# Patient Record
Sex: MALE | Race: WHITE | NOT HISPANIC OR LATINO | Employment: UNEMPLOYED | URBAN - METROPOLITAN AREA
[De-identification: names, ages, dates, MRNs, and addresses within clinical notes are randomized per-mention and may not be internally consistent; named-entity substitution may affect disease eponyms.]

---
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
## 2024-03-22 ENCOUNTER — DOCUMENTATION (OUTPATIENT)
Dept: PSYCHIATRY | Facility: CLINIC | Age: 19
End: 2024-03-22

## 2024-03-22 DIAGNOSIS — F90.0 ADHD (ATTENTION DEFICIT HYPERACTIVITY DISORDER), INATTENTIVE TYPE: Primary | ICD-10-CM

## 2024-03-22 NOTE — PSYCH
"PSYCHIATRIC DISCHARGE SUMMARY    [facility]    Name and Date of Birth:  Dallas Tafoya 19 y.o. 2005    Admission Date: 5/10/2018  Discharge Date: 3/22/2024 Referral source: self    Discharge Type: Did not return for follow up    Discharge Diagnosis:     1. ADHD (attention deficit hyperactivity disorder), inattentive type          Treating Physician: Nichole Malhotra MD     Treatment Complications: Did not return for follow up.     Admit with discharge: No    Prognosis at time of discharge: Fair    Presenting Problems/Pertinent Findings:      Dallas initially presented for treatment due to  ADHD symptoms . Primary complaints included ADHD SYMPTOMS: decreased concentration, decreased attention span, restlessness.  He reported on 8/14/23 at initial evaluation with this writer \"more difficulty concentrating and hyperactivity, attention span is shorter and he is jumping around thought to thought \"    Therapist: None    Course of Treatment: Psychiatric Evaluation and Medication Management    Summary of Treatment Progress:     Dallas was seen for initial Psychiatric Evaluation and medication management. He initially began seeing Dr. Mcknight on 5/10/2018 for ADHD. He then was transferred to resident clinic in November 2022 and saw Dr. Castillo for two visits.  He initially saw this writer for evaluation in 8/14/23, and follow up on 9/18/23. During the course of treatment he was treated mostly with stimulants, including concerta 36mg daily most recently. At the last visit on 9/18/23 he denied any other suicidal ideation, intent or plan at present, denies any other homicidal ideation, intent or plan at present. He denies any auditory hallucinations, denies any visual hallucinations, denies any delusions. He denies any side effects from medications.     Past Psychiatric History:     Past Psychiatric History:  Previous inpatient psychiatric admissions:  Denies.  states he " had to go to the ED in 2021 for suspicion for SI, but he denies ever going to inpatient unit.  Previous intensive outpatient psychiatric services: denies  Previous inpatient/outpatient substance abuse rehabilitation: denies.  Present/previous outpatient psychiatric services[facility] for [facility], most recently with Dr. Castillo for 7 months  Present/previous psychotherapy services: briefly when younger.  History of suicidal attempts/gestures: denies, 2 years ago, didn't go to hospital.  History of violence/aggressive behaviors: denies.  Present/previous psychotropic medication use: zoloft (stopped due to feeling better w depression/anxiety), ativan (anx and panic attacks, stopped because feeling better), vyvanse, adderall, concerta (worked the best)     Substance Abuse History:   Obtained from most recent evaluation with Dr. Castillo. Updated and verified with patient today.  Patient denies history of alcohol, illict substance, or tobacco abuse. Patient denies previous legal actions or arrests related to substance intoxication including prior DWIs/DUIs. Dallas does not apear under the influence or withdrawal of any psychoactive substance throughout today's examination.      Social History:  Obtained from most recent evaluation with Dr. Castillo. Updated and verified with patient today.  Developmental: denies a history of milestone/developmental delay. Denies a history of in-utero exposure to toxins/illicit substances. There is no documented history of IEP or need for special education.  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 Previously employed at [employer].  Also working as a   Access  to firearms: yes, father owns, safe, secured, keys hidden. Dallas Tafoya has no history of arrests or violence pertaining to use of a deadly weapon.     History Review:  The following portions of the patient's history were reviewed and updated as appropriate: allergies, current medications, past family history, past medical history, past social history, past surgical history, and problem list.. Reviewed on 9/18/23.      MENTAL STATUS EVALUATION (at time of most recent visit on 9/18/23):    Appearance age appropriate, casually dressed, long hair   Behavior pleasant, cooperative, calm   Speech normal rate, normal volume, normal pitch   Mood euthymic   Affect normal range and intensity, appropriate   Thought Processes organized, logical, goal directed   Associations intact associations   Thought Content no overt delusions   Perceptual Disturbances: Denies auditory or visual hallucinations and Does not appear to be responding to internal stimuli   Abnormal Thoughts  Risk Potential Denies suicidal or homicidal ideation, plan, or intent   Orientation oriented to person, place, time/date and situation   Memory recent and remote memory grossly intact   Consciousness alert and awake   Attention Span Concentration Span attention span and concentration are age appropriate   Intellect appears to be of average intelligence   Insight fair   Judgement fair   Muscle Strength and  Gait unable to assess today due to virtual visit   Motor Activity unable to assess today due to virtual visit   Language no difficulty naming common objects, no difficulty repeating a phrase   Fund of Knowledge adequate knowledge of current events  adequate fund of knowledge regarding past history  adequate fund of knowledge regarding vocabulary         To what extent did Dallas achieve his goals?: Most    Criteria for Discharge: Did not return for treatment. Did not respond to reminder letter to reschedule follow up appointment.    Aftercare  Recommendations:     Follow up with family physician for psychiatric issues.    Discharge Medications:     Current Outpatient Medications:     albuterol (2.5 mg/3 mL) 0.083 % nebulizer solution, Inhale, Disp: , Rfl:     albuterol (PROVENTIL HFA,VENTOLIN HFA) 90 mcg/act inhaler, INHALE 2 PUFFS EVERY 6 (SIX) HOURS AS NEEDED FOR WHEEZING, Disp: 18 g, Rfl: 0    benzonatate (TESSALON PERLES) 100 mg capsule, Take 1 capsule (100 mg total) by mouth 3 (three) times a day as needed for cough (Patient not taking: Reported on 5/12/2023), Disp: 20 capsule, Rfl: 0    brompheniramine-pseudoephedrine-DM 30-2-10 MG/5ML syrup, Take 10 mL by mouth 3 (three) times a day as needed for congestion or cough (Patient not taking: Reported on 12/22/2022), Disp: 120 mL, Rfl: 0    Cholecalciferol 2000 units CAPS, Take 1 capsule by mouth (Patient not taking: Reported on 11/12/2021), Disp: , Rfl:     cyclobenzaprine (FLEXERIL) 5 mg tablet, Take 5-10 mg by mouth Three times daily as needed (Patient not taking: Reported on 6/9/2022), Disp: , Rfl:     fluticasone (FLONASE) 50 mcg/act nasal spray, 1 SPRAY INTO EACH NOSTRIL DAILY, Disp: 16 g, Rfl: 0    fluticasone (FLOVENT HFA) 110 MCG/ACT inhaler, Inhale (Patient not taking: Reported on 1/31/2023), Disp: , Rfl:     LORazepam (ATIVAN) 0.5 mg tablet, Take 1 tablet (0.5 mg total) by mouth daily as needed (Severe anxiety or panic attack) (Patient not taking: Reported on 9/27/2022), Disp: 15 tablet, Rfl: 0    methylphenidate (Concerta) 36 MG ER tablet, Take 1 tablet (36 mg total) by mouth daily Max Daily Amount: 36 mg, Disp: 30 tablet, Rfl: 0    montelukast (SINGULAIR) 5 mg chewable tablet, Chew 1 tablet (5 mg total) daily at bedtime, Disp: 90 tablet, Rfl: 1    naproxen (Naprosyn) 500 mg tablet, Take 1 tablet (500 mg total) by mouth 2 (two) times a day with meals for 11 days, Disp: 21 tablet, Rfl: 0    SODIUM FLUORIDE, DENTAL GEL, 1.1 % GEL, USE AS DIRECTED (Patient not taking: Reported on 9/27/2022),  Disp: , Rfl:     VITAMIN D3 SUPER STRENGTH 2000 units tablet, , Disp: , Rfl:      Describe ability and willingness to work and solve mental problems:     Able to solve his mental health problems    Nichole Malhotra MD 03/22/24

## 2024-03-25 ENCOUNTER — TELEPHONE (OUTPATIENT)
Dept: PSYCHIATRY | Facility: CLINIC | Age: 19
End: 2024-03-25

## 2024-03-25 NOTE — TELEPHONE ENCOUNTER
DISCHARGE LETTER for [facility] (certified and regular) placed in outgoing mail on 03/25/24.    Article #:  [id number]    Address:  [address] [place] [id number]

## 2024-04-03 ENCOUNTER — OCCMED (OUTPATIENT)
Dept: URGENT CARE | Facility: MEDICAL CENTER | Age: 19
End: 2024-04-03
Payer: OTHER MISCELLANEOUS

## 2024-04-03 ENCOUNTER — APPOINTMENT (OUTPATIENT)
Dept: RADIOLOGY | Facility: MEDICAL CENTER | Age: 19
End: 2024-04-03

## 2024-04-03 DIAGNOSIS — M25.531 ACUTE PAIN OF RIGHT WRIST: Primary | ICD-10-CM

## 2024-04-03 PROCEDURE — 99283 EMERGENCY DEPT VISIT LOW MDM: CPT | Performed by: ORTHOPAEDIC SURGERY

## 2024-04-03 PROCEDURE — 73110 X-RAY EXAM OF WRIST: CPT

## 2024-04-03 PROCEDURE — G0382 LEV 3 HOSP TYPE B ED VISIT: HCPCS | Performed by: ORTHOPAEDIC SURGERY

## 2024-04-09 ENCOUNTER — APPOINTMENT (OUTPATIENT)
Dept: URGENT CARE | Facility: MEDICAL CENTER | Age: 19
End: 2024-04-09
Payer: OTHER MISCELLANEOUS

## 2024-04-09 PROCEDURE — 99213 OFFICE O/P EST LOW 20 MIN: CPT | Performed by: NURSE PRACTITIONER

## 2024-08-10 NOTE — TELEPHONE ENCOUNTER
Hyperkalemia is likely due to patient missed 3 dialysis sessions in the setting of ESRD.The patients most recent potassium results are listed below.  Recent Labs     08/08/24  1035 08/09/24  0251 08/10/24  0348   K 4.9 4.9 3.9       Plan  -Neprhology following  -Hyperkalemia improving  - continue the dialysis per nephrology recommendations  - Monitor for arrhythmias with EKG and/or continuous telemetry.   - BMP, Mg, Phos  in am, Replete accordingly             I spoke with Dallas's father, Stone Vinson  He was in a conference call with his work and asked is he could call back in about 15 minutes  He said Buck Feng is safe now

## 2024-11-07 ENCOUNTER — OFFICE VISIT (OUTPATIENT)
Dept: URGENT CARE | Age: 19
End: 2024-11-07
Payer: COMMERCIAL

## 2024-11-07 VITALS
RESPIRATION RATE: 18 BRPM | HEART RATE: 98 BPM | DIASTOLIC BLOOD PRESSURE: 76 MMHG | OXYGEN SATURATION: 97 % | SYSTOLIC BLOOD PRESSURE: 114 MMHG | BODY MASS INDEX: 22.35 KG/M2 | TEMPERATURE: 98.2 F | WEIGHT: 147 LBS

## 2024-11-07 DIAGNOSIS — J02.9 SORE THROAT: ICD-10-CM

## 2024-11-07 DIAGNOSIS — J04.0 ACUTE LARYNGITIS: Primary | ICD-10-CM

## 2024-11-07 LAB — S PYO AG THROAT QL: NEGATIVE

## 2024-11-07 PROCEDURE — 99213 OFFICE O/P EST LOW 20 MIN: CPT

## 2024-11-07 PROCEDURE — 87880 STREP A ASSAY W/OPTIC: CPT

## 2024-11-07 RX ORDER — METHYLPREDNISOLONE 4 MG/1
TABLET ORAL
Qty: 21 TABLET | Refills: 0 | Status: SHIPPED | OUTPATIENT
Start: 2024-11-07

## 2024-11-07 RX ORDER — METHYLPREDNISOLONE 4 MG/1
TABLET ORAL
Qty: 21 TABLET | Refills: 0 | Status: SHIPPED | OUTPATIENT
Start: 2024-11-07 | End: 2024-11-07

## 2024-11-07 NOTE — PROGRESS NOTES
[facility]        NAME: Dallas Tafoya is a 19 y.o. male  : 2005    MRN: [id number]  DATE: 2024  TIME: 8:37 AM    Assessment and Plan   Acute laryngitis [J04.0]  1. Acute laryngitis  methylPREDNISolone 4 MG tablet therapy pack      2. Sore throat  POCT rapid ANTIGEN strepA        Sore throat, PND , constant clearing for 3 days. No fevers. Rapid strep eg- discussed symptom management.     Patient Instructions       Follow up with PCP in 3-5 days.  Proceed to  ER if symptoms worsen.    If tests have been performed at [facility], our office will contact you with results if changes need to be made to the care plan discussed with you at the visit.  You can review your full results on [facility].    Chief Complaint     Chief Complaint   Patient presents with    Cold Like Symptoms     States was doing at home remedies for same but nothing seems to be working  Ongoing for about three days         History of Present Illness       Sore throat, PND , constant clearing for 3 days. No fevers. Rapid strep eg- discussed symptom management.         Review of Systems   Review of Systems   Constitutional:  Negative for fever.   HENT:  Positive for postnasal drip, sore throat and voice change.    Respiratory:  Negative for cough and wheezing.    All other systems reviewed and are negative.        Current Medications       Current Outpatient Medications:     methylPREDNISolone 4 MG tablet therapy pack, Use as directed on package, Disp: 21 tablet, Rfl: 0    albuterol (2.5 mg/3 mL) 0.083 % nebulizer solution, Inhale (Patient not taking: Reported on 2024), Disp: , Rfl:     albuterol (PROVENTIL HFA,VENTOLIN HFA) 90 mcg/act inhaler, INHALE 2 PUFFS EVERY 6 (SIX) HOURS AS NEEDED FOR WHEEZING (Patient not taking: Reported on 2024), Disp: 18 g, Rfl: 0    benzonatate (TESSALON PERLES) 100 mg capsule, Take 1 capsule (100 mg total) by mouth 3 (three) times a day as needed for cough (Patient not taking:  Reported on 5/12/2023), Disp: 20 capsule, Rfl: 0    brompheniramine-pseudoephedrine-DM 30-2-10 MG/5ML syrup, Take 10 mL by mouth 3 (three) times a day as needed for congestion or cough (Patient not taking: Reported on 12/22/2022), Disp: 120 mL, Rfl: 0    Cholecalciferol 2000 units CAPS, Take 1 capsule by mouth (Patient not taking: Reported on 11/12/2021), Disp: , Rfl:     cyclobenzaprine (FLEXERIL) 5 mg tablet, Take 5-10 mg by mouth Three times daily as needed (Patient not taking: Reported on 6/9/2022), Disp: , Rfl:     fluticasone (FLONASE) 50 mcg/act nasal spray, 1 SPRAY INTO EACH NOSTRIL DAILY (Patient not taking: Reported on 11/7/2024), Disp: 16 g, Rfl: 0    fluticasone (FLOVENT HFA) 110 MCG/ACT inhaler, Inhale (Patient not taking: Reported on 1/31/2023), Disp: , Rfl:     LORazepam (ATIVAN) 0.5 mg tablet, Take 1 tablet (0.5 mg total) by mouth daily as needed (Severe anxiety or panic attack) (Patient not taking: Reported on 9/27/2022), Disp: 15 tablet, Rfl: 0    methylphenidate (Concerta) 36 MG ER tablet, Take 1 tablet (36 mg total) by mouth daily Max Daily Amount: 36 mg (Patient not taking: Reported on 11/7/2024), Disp: 30 tablet, Rfl: 0    montelukast (SINGULAIR) 5 mg chewable tablet, Chew 1 tablet (5 mg total) daily at bedtime (Patient not taking: Reported on 11/7/2024), Disp: 90 tablet, Rfl: 1    naproxen (Naprosyn) 500 mg tablet, Take 1 tablet (500 mg total) by mouth 2 (two) times a day with meals for 11 days, Disp: 21 tablet, Rfl: 0    SODIUM FLUORIDE, DENTAL GEL, 1.1 % GEL, USE AS DIRECTED (Patient not taking: Reported on 9/27/2022), Disp: , Rfl:     VITAMIN D3 SUPER STRENGTH 2000 units tablet, , Disp: , Rfl:     Current Allergies     Allergies as of 11/07/2024 - Reviewed 11/07/2024   Allergen Reaction Noted    Codeine  03/18/2015            The following portions of the patient's history were reviewed and updated as appropriate: allergies, current medications, past family history, past medical history,  past social history, past surgical history and problem list.     Past Medical History:   Diagnosis Date    ADHD (attention deficit hyperactivity disorder)     Anxiety     Depression        No past surgical history on file.    No family history on file.      Medications have been verified.        Objective   /76   Pulse 98   Temp 98.2 °F (36.8 °C)   Resp 18   Wt 66.7 kg (147 lb)   SpO2 97%   BMI 22.35 kg/m²   No LMP for male patient.       Physical Exam     Physical Exam  Vitals reviewed.   Constitutional:       Appearance: Normal appearance.   HENT:      Mouth/Throat:      Pharynx: Posterior oropharyngeal erythema present.   Cardiovascular:      Rate and Rhythm: Normal rate and regular rhythm.      Pulses: Normal pulses.      Heart sounds: Normal heart sounds.   Pulmonary:      Effort: Pulmonary effort is normal.      Breath sounds: Normal breath sounds.   Lymphadenopathy:      Cervical: No cervical adenopathy.   Neurological:      Mental Status: He is alert.

## 2024-12-27 ENCOUNTER — APPOINTMENT (EMERGENCY)
Dept: RADIOLOGY | Facility: HOSPITAL | Age: 19
End: 2024-12-27

## 2024-12-27 ENCOUNTER — HOSPITAL ENCOUNTER (EMERGENCY)
Facility: HOSPITAL | Age: 19
Discharge: HOME | End: 2024-12-27
Attending: EMERGENCY MEDICINE

## 2024-12-27 VITALS
DIASTOLIC BLOOD PRESSURE: 66 MMHG | OXYGEN SATURATION: 100 % | HEART RATE: 72 BPM | BODY MASS INDEX: 22.43 KG/M2 | WEIGHT: 147.49 LBS | TEMPERATURE: 98.6 F | RESPIRATION RATE: 18 BRPM | SYSTOLIC BLOOD PRESSURE: 146 MMHG

## 2024-12-27 DIAGNOSIS — R09.A2 SENSATION OF FOREIGN BODY IN THROAT: Primary | ICD-10-CM

## 2024-12-27 PROCEDURE — 99283 EMERGENCY DEPT VISIT LOW MDM: CPT | Performed by: EMERGENCY MEDICINE

## 2024-12-27 PROCEDURE — 99283 EMERGENCY DEPT VISIT LOW MDM: CPT

## 2024-12-27 PROCEDURE — 70360 X-RAY EXAM OF NECK: CPT

## 2024-12-27 NOTE — ED PROVIDER NOTES
ED Disposition       None          Assessment & Plan       Medical Decision Making           Medications - No data to display    ED Risk Strat Scores                                              History of Present Illness       Chief Complaint   Patient presents with    Foreign Body in Throat     Patient states that he was eating a steak prior to arrival and felt something sharp scratch throat. Wants to make sure there is nothing stuck in throat. Patient with no difficulty speaking or breathing in triage.        Past Medical History:   Diagnosis Date    ADHD (attention deficit hyperactivity disorder)     Anxiety     Depression       History reviewed. No pertinent surgical history.   History reviewed. No pertinent family history.   Social History     Tobacco Use    Smoking status: Never    Smokeless tobacco: Never   Vaping Use    Vaping status: Never Used   Substance Use Topics    Alcohol use: Never    Drug use: Never      E-Cigarette/Vaping    E-Cigarette Use Never User       E-Cigarette/Vaping Substances      I have reviewed and agree with the history as documented.     Patient is a 19-year-old male with no known past medical history presenting with a 1 hour history of left-sided foreign body sensation in his throat after eating a piece of steak.  He reports that the piece of steak that he was eating did not have any obvious bone fragments but reports feeling something sharp in the left side of his throat as he was swallowing.  He reports being able to tolerate fluids and solids but reports some mild discomfort as he swallows.  Denies chest pain, shortness of breath, dysphagia.      History provided by:  Patient  Foreign Body in Throat  Associated symptoms: sore throat    Associated symptoms: no abdominal pain, no cough, no nausea and no vomiting        Review of Systems   Constitutional:  Negative for chills, fatigue and fever.   HENT:  Positive for sore throat.    Respiratory:  Negative for cough, chest  tightness and shortness of breath.    Cardiovascular:  Negative for palpitations.   Gastrointestinal:  Negative for abdominal pain, diarrhea, nausea and vomiting.   Musculoskeletal:  Negative for myalgias and neck pain.   Neurological:  Negative for weakness, numbness and headaches.   All other systems reviewed and are negative.          Objective       ED Triage Vitals   Temperature Pulse Blood Pressure Respirations SpO2 Patient Position - Orthostatic VS   12/27/24 1641 12/27/24 1641 12/27/24 1643 12/27/24 1641 12/27/24 1641 12/27/24 1641   98.6 °F (37 °C) 72 146/66 18 100 % Sitting      Temp src Heart Rate Source BP Location FiO2 (%) Pain Score    -- 12/27/24 1641 12/27/24 1641 -- --     Monitor Right arm        Vitals      Date and Time Temp Pulse SpO2 Resp BP Pain Score FACES Pain Rating User   12/27/24 1643 -- -- -- -- 146/66 -- -- EK   12/27/24 1641 98.6 °F (37 °C) 72 100 % 18 -- -- -- EK            Physical Exam  Vitals and nursing note reviewed.   Constitutional:       General: He is not in acute distress.     Appearance: He is not ill-appearing.   HENT:      Head: Normocephalic and atraumatic.      Mouth/Throat:      Mouth: Mucous membranes are moist.      Pharynx: Oropharynx is clear. No pharyngeal swelling or posterior oropharyngeal erythema.   Eyes:      Pupils: Pupils are equal, round, and reactive to light.   Cardiovascular:      Rate and Rhythm: Normal rate and regular rhythm.   Pulmonary:      Effort: Pulmonary effort is normal.   Abdominal:      General: Abdomen is flat.   Musculoskeletal:         General: No swelling or tenderness.   Skin:     General: Skin is warm and dry.   Neurological:      General: No focal deficit present.      Mental Status: Mental status is at baseline.   Psychiatric:         Mood and Affect: Mood normal.         Behavior: Behavior normal.         Results Reviewed       None            No orders to display       Procedures    ED Medication and Procedure Management   Prior  to Admission Medications   Prescriptions Last Dose Informant Patient Reported? Taking?   Cholecalciferol 2000 units CAPS   Yes No   Sig: Take 1 capsule by mouth   Patient not taking: Reported on 2021   LORazepam (ATIVAN) 0.5 mg tablet   No No   Sig: Take 1 tablet (0.5 mg total) by mouth daily as needed (Severe anxiety or panic attack)   Patient not taking: Reported on 2022   SODIUM FLUORIDE, DENTAL GEL, 1.1 % GEL   Yes No   Sig: USE AS DIRECTED   Patient not taking: Reported on 2022   VITAMIN D3 SUPER STRENGTH 2000 units tablet   Yes No   Patient not taking: Reported on 2024   albuterol (2.5 mg/3 mL) 0.083 % nebulizer solution   Yes No   Sig: Inhale   Patient not taking: Reported on 2024   albuterol (PROVENTIL HFA,VENTOLIN HFA) 90 mcg/act inhaler   No No   Sig: INHALE 2 PUFFS EVERY 6 (SIX) HOURS AS NEEDED FOR WHEEZING   Patient not taking: Reported on 2024   benzonatate (TESSALON PERLES) 100 mg capsule   No No   Sig: Take 1 capsule (100 mg total) by mouth 3 (three) times a day as needed for cough   Patient not taking: Reported on 2023   brompheniramine-pseudoephedrine-DM 30-2-10 MG/5ML syrup   No No   Sig: Take 10 mL by mouth 3 (three) times a day as needed for congestion or cough   Patient not taking: Reported on 2022   cyclobenzaprine (FLEXERIL) 5 mg tablet   Yes No   Sig: Take 5-10 mg by mouth Three times daily as needed   Patient not taking: Reported on 2022   fluticasone (FLONASE) 50 mcg/act nasal spray   No No   Si SPRAY INTO EACH NOSTRIL DAILY   Patient not taking: Reported on 2024   fluticasone (FLOVENT HFA) 110 MCG/ACT inhaler   Yes No   Sig: Inhale   Patient not taking: Reported on 2023   methylPREDNISolone 4 MG tablet therapy pack   No No   Sig: Use as directed on package   methylphenidate (Concerta) 36 MG ER tablet   No No   Sig: Take 1 tablet (36 mg total) by mouth daily Max Daily Amount: 36 mg   Patient not taking: Reported on 2024    montelukast (SINGULAIR) 5 mg chewable tablet   No No   Sig: Chew 1 tablet (5 mg total) daily at bedtime   Patient not taking: Reported on 11/7/2024   naproxen (Naprosyn) 500 mg tablet   No No   Sig: Take 1 tablet (500 mg total) by mouth 2 (two) times a day with meals for 11 days      Facility-Administered Medications: None     Patient's Medications   Discharge Prescriptions    No medications on file     No discharge procedures on file.  ED SEPSIS DOCUMENTATION            Ronald Kingsley PA-C  12/27/24 [id number]

## 2024-12-27 NOTE — ED ATTENDING ATTESTATION
12/27/2024  IAlejo DO, saw and evaluated the patient. I have discussed the patient with the resident/non-physician practitioner and agree with the resident's/non-physician practitioner's findings, Plan of Care, and MDM as documented in the resident's/non-physician practitioner's note, except where noted. All available labs and Radiology studies were reviewed.  I was present for key portions of any procedure(s) performed by the resident/non-physician practitioner and I was immediately available to provide assistance.       At this point I agree with the current assessment done in the Emergency Department.  I have conducted an independent evaluation of this patient a history and physical is as follows:    Patient presents for foreign body sensation in his throat after eating steak.  Has been able to tolerate p.o. at home and here.  He has been observed for period time Emergency Department without any evidence of worsening clinical status or respiratory distress.  He has tolerated drinking and eating crackers.  On evaluation he has no stridor increased work of breathing or adventitious breath sounds.  He speaking full sentences.  X-ray did not reveal any obvious foreign body.  Plan for symptomatic and supportive care.  Advised for soft diet next day.        ED Course         Critical Care Time  Procedures

## 2024-12-27 NOTE — DISCHARGE INSTRUCTIONS
Follow with primary care provider for review and continuation of care. For worsening symptoms or the development of inability to swallow, severe pain when swallowing, severe headache, chest pain, SOB, abdominal pain, nausea, vomiting, diarrhea, or weakness, call 911 or return to the emergency department for further evaluation.

## 2024-12-28 ENCOUNTER — RESULTS FOLLOW-UP (OUTPATIENT)
Dept: EMERGENCY DEPT | Facility: HOSPITAL | Age: 19
End: 2024-12-28

## 2025-01-17 ENCOUNTER — OFFICE VISIT (OUTPATIENT)
Dept: FAMILY MEDICINE CLINIC | Facility: CLINIC | Age: 20
End: 2025-01-17
Payer: COMMERCIAL

## 2025-01-17 ENCOUNTER — APPOINTMENT (OUTPATIENT)
Dept: LAB | Age: 20
End: 2025-01-17
Payer: COMMERCIAL

## 2025-01-17 VITALS
TEMPERATURE: 97.3 F | DIASTOLIC BLOOD PRESSURE: 80 MMHG | SYSTOLIC BLOOD PRESSURE: 134 MMHG | BODY MASS INDEX: 22.58 KG/M2 | WEIGHT: 149 LBS | HEART RATE: 81 BPM | HEIGHT: 68 IN | RESPIRATION RATE: 16 BRPM | OXYGEN SATURATION: 99 %

## 2025-01-17 DIAGNOSIS — E21.5 PARATHYROID ABNORMALITY (HCC): ICD-10-CM

## 2025-01-17 DIAGNOSIS — Z00.00 HEALTHCARE MAINTENANCE: Primary | ICD-10-CM

## 2025-01-17 DIAGNOSIS — J45.20 MILD INTERMITTENT ASTHMA WITHOUT COMPLICATION: ICD-10-CM

## 2025-01-17 DIAGNOSIS — Z23 ENCOUNTER FOR IMMUNIZATION: ICD-10-CM

## 2025-01-17 DIAGNOSIS — Z00.00 HEALTHCARE MAINTENANCE: ICD-10-CM

## 2025-01-17 LAB
ALBUMIN SERPL BCG-MCNC: 4.7 G/DL (ref 3.5–5)
ALP SERPL-CCNC: 62 U/L (ref 34–104)
ALT SERPL W P-5'-P-CCNC: 11 U/L (ref 7–52)
ANION GAP SERPL CALCULATED.3IONS-SCNC: 11 MMOL/L (ref 4–13)
AST SERPL W P-5'-P-CCNC: 13 U/L (ref 13–39)
BASOPHILS # BLD AUTO: 0.04 THOUSANDS/ΜL (ref 0–0.1)
BASOPHILS NFR BLD AUTO: 1 % (ref 0–1)
BILIRUB SERPL-MCNC: 0.93 MG/DL (ref 0.2–1)
BUN SERPL-MCNC: 22 MG/DL (ref 5–25)
CALCIUM SERPL-MCNC: 9.3 MG/DL (ref 8.4–10.2)
CHLORIDE SERPL-SCNC: 106 MMOL/L (ref 96–108)
CHOLEST SERPL-MCNC: 148 MG/DL (ref ?–200)
CO2 SERPL-SCNC: 24 MMOL/L (ref 21–32)
CREAT SERPL-MCNC: 0.8 MG/DL (ref 0.6–1.3)
EOSINOPHIL # BLD AUTO: 0.11 THOUSAND/ΜL (ref 0–0.61)
EOSINOPHIL NFR BLD AUTO: 2 % (ref 0–6)
ERYTHROCYTE [DISTWIDTH] IN BLOOD BY AUTOMATED COUNT: 11.7 % (ref 11.6–15.1)
GFR SERPL CREATININE-BSD FRML MDRD: 128 ML/MIN/1.73SQ M
GLUCOSE P FAST SERPL-MCNC: 96 MG/DL (ref 65–99)
HCT VFR BLD AUTO: 40.8 % (ref 36.5–49.3)
HDLC SERPL-MCNC: 58 MG/DL
HGB BLD-MCNC: 14.7 G/DL (ref 12–17)
IMM GRANULOCYTES # BLD AUTO: 0.01 THOUSAND/UL (ref 0–0.2)
IMM GRANULOCYTES NFR BLD AUTO: 0 % (ref 0–2)
LDLC SERPL CALC-MCNC: 77 MG/DL (ref 0–100)
LYMPHOCYTES # BLD AUTO: 1.85 THOUSANDS/ΜL (ref 0.6–4.47)
LYMPHOCYTES NFR BLD AUTO: 35 % (ref 14–44)
MCH RBC QN AUTO: 33.3 PG (ref 26.8–34.3)
MCHC RBC AUTO-ENTMCNC: 36 G/DL (ref 31.4–37.4)
MCV RBC AUTO: 93 FL (ref 82–98)
MONOCYTES # BLD AUTO: 0.55 THOUSAND/ΜL (ref 0.17–1.22)
MONOCYTES NFR BLD AUTO: 10 % (ref 4–12)
NEUTROPHILS # BLD AUTO: 2.74 THOUSANDS/ΜL (ref 1.85–7.62)
NEUTS SEG NFR BLD AUTO: 52 % (ref 43–75)
NRBC BLD AUTO-RTO: 0 /100 WBCS
PLATELET # BLD AUTO: 231 THOUSANDS/UL (ref 149–390)
PMV BLD AUTO: 11.6 FL (ref 8.9–12.7)
POTASSIUM SERPL-SCNC: 3.5 MMOL/L (ref 3.5–5.3)
PROT SERPL-MCNC: 6.7 G/DL (ref 6.4–8.4)
RBC # BLD AUTO: 4.41 MILLION/UL (ref 3.88–5.62)
SODIUM SERPL-SCNC: 141 MMOL/L (ref 135–147)
TRIGL SERPL-MCNC: 66 MG/DL (ref ?–150)
TSH SERPL DL<=0.05 MIU/L-ACNC: 1.42 UIU/ML (ref 0.45–4.5)
WBC # BLD AUTO: 5.3 THOUSAND/UL (ref 4.31–10.16)

## 2025-01-17 PROCEDURE — 36415 COLL VENOUS BLD VENIPUNCTURE: CPT

## 2025-01-17 PROCEDURE — 84443 ASSAY THYROID STIM HORMONE: CPT

## 2025-01-17 PROCEDURE — 85025 COMPLETE CBC W/AUTO DIFF WBC: CPT

## 2025-01-17 PROCEDURE — 80053 COMPREHEN METABOLIC PANEL: CPT

## 2025-01-17 PROCEDURE — 90471 IMMUNIZATION ADMIN: CPT

## 2025-01-17 PROCEDURE — 80061 LIPID PANEL: CPT

## 2025-01-17 PROCEDURE — 99395 PREV VISIT EST AGE 18-39: CPT | Performed by: NURSE PRACTITIONER

## 2025-01-17 PROCEDURE — 90656 IIV3 VACC NO PRSV 0.5 ML IM: CPT

## 2025-01-17 RX ORDER — ALBUTEROL SULFATE 90 UG/1
2 INHALANT RESPIRATORY (INHALATION) EVERY 6 HOURS PRN
Qty: 18 G | Refills: 0 | Status: SHIPPED | OUTPATIENT
Start: 2025-01-17

## 2025-01-17 NOTE — ASSESSMENT & PLAN NOTE
- Reviewed immunizations and screenings.   - Discussed regular dental and vision exams.   - Routine labs ordered.   Orders:  •  CBC and differential; Future  •  Comprehensive metabolic panel; Future  •  Lipid Panel with Direct LDL reflex; Future  •  TSH, 3rd generation with Free T4 reflex; Future

## 2025-01-17 NOTE — PROGRESS NOTES
Name: Dallas Tafoya      : 2005      MRN: [id number]  Encounter Provider: ILYA Luu  Encounter Date: 2025   Encounter department: [facility]    Assessment & Plan  Healthcare maintenance  - Reviewed immunizations and screenings.   - Discussed regular dental and vision exams.   - Routine labs ordered.   Orders:  •  CBC and differential; Future  •  Comprehensive metabolic panel; Future  •  Lipid Panel with Direct LDL reflex; Future  •  TSH, 3rd generation with Free T4 reflex; Future    Parathyroid abnormality (HCC)  - Will check updated labs.   Orders:  •  Comprehensive metabolic panel; Future    Mild intermittent asthma without complication  - Well controlled.  - Continue albuterol as needed.  - Will continue to monitor.   Orders:  •  albuterol (PROVENTIL HFA,VENTOLIN HFA) 90 mcg/act inhaler; Inhale 2 puffs every 6 (six) hours as needed for wheezing    Encounter for immunization    Orders:  •  influenza vaccine preservative-free 0.5 mL IM (Fluzone, Afluria, Fluarix, Flulaval)         History of Present Illness     Patient with PMH of asthma, anxiety, depression, and ADHD presents to office today for annual physical. He is not taking any medications on a daily basis. He is due for updated blood work. Denies any concerns or complaints today.        Review of Systems   Constitutional:  Negative for fatigue and fever.   HENT:  Negative for congestion, postnasal drip and trouble swallowing.    Eyes:  Negative for visual disturbance.   Respiratory:  Negative for cough and shortness of breath.    Cardiovascular:  Negative for chest pain and palpitations.   Gastrointestinal:  Negative for abdominal pain and blood in stool.   Endocrine: Negative for cold intolerance and heat intolerance.   Genitourinary:  Negative for difficulty urinating, dysuria and frequency.   Musculoskeletal:  Negative for gait problem.   Skin:  Negative for rash.   Neurological:  Negative for  dizziness, syncope, speech difficulty and headaches.   Hematological:  Negative for adenopathy.   Psychiatric/Behavioral:  Negative for behavioral problems.      Past Medical History:   Diagnosis Date   • ADHD (attention deficit hyperactivity disorder)    • Anxiety    • Depression      History reviewed. No pertinent surgical history.  History reviewed. No pertinent family history.  Social History     Tobacco Use   • Smoking status: Never   • Smokeless tobacco: Never   Vaping Use   • Vaping status: Never Used   Substance and Sexual Activity   • Alcohol use: Never   • Drug use: Never   • Sexual activity: Not on file     Current Outpatient Medications on File Prior to Visit   Medication Sig   • albuterol (2.5 mg/3 mL) 0.083 % nebulizer solution Inhale   • [DISCONTINUED] albuterol (PROVENTIL HFA,VENTOLIN HFA) 90 mcg/act inhaler INHALE 2 PUFFS EVERY 6 (SIX) HOURS AS NEEDED FOR WHEEZING   • benzonatate (TESSALON PERLES) 100 mg capsule Take 1 capsule (100 mg total) by mouth 3 (three) times a day as needed for cough (Patient not taking: Reported on 1/17/2025)   • brompheniramine-pseudoephedrine-DM 30-2-10 MG/5ML syrup Take 10 mL by mouth 3 (three) times a day as needed for congestion or cough (Patient not taking: Reported on 1/17/2025)   • Cholecalciferol 2000 units CAPS Take 1 capsule by mouth (Patient not taking: Reported on 1/17/2025)   • cyclobenzaprine (FLEXERIL) 5 mg tablet Take 5-10 mg by mouth Three times daily as needed (Patient not taking: Reported on 1/17/2025)   • fluticasone (FLONASE) 50 mcg/act nasal spray 1 SPRAY INTO EACH NOSTRIL DAILY (Patient not taking: Reported on 1/17/2025)   • fluticasone (FLOVENT HFA) 110 MCG/ACT inhaler Inhale (Patient not taking: Reported on 1/17/2025)   • LORazepam (ATIVAN) 0.5 mg tablet Take 1 tablet (0.5 mg total) by mouth daily as needed (Severe anxiety or panic attack) (Patient not taking: Reported on 1/17/2025)   • methylphenidate (Concerta) 36 MG ER tablet Take 1 tablet (36  "mg total) by mouth daily Max Daily Amount: 36 mg (Patient not taking: Reported on 1/17/2025)   • methylPREDNISolone 4 MG tablet therapy pack Use as directed on package (Patient not taking: Reported on 1/17/2025)   • montelukast (SINGULAIR) 5 mg chewable tablet Chew 1 tablet (5 mg total) daily at bedtime (Patient not taking: Reported on 1/17/2025)   • naproxen (Naprosyn) 500 mg tablet Take 1 tablet (500 mg total) by mouth 2 (two) times a day with meals for 11 days   • SODIUM FLUORIDE, DENTAL GEL, 1.1 % GEL USE AS DIRECTED (Patient not taking: Reported on 1/17/2025)   • VITAMIN D3 SUPER STRENGTH 2000 units tablet  (Patient not taking: Reported on 1/17/2025)     Allergies   Allergen Reactions   • Codeine      Other reaction(s): Mood Change     Immunization History   Administered Date(s) Administered   • COVID-19 PFIZER VACCINE 0.3 ML IM 04/27/2021, 05/18/2021, 02/01/2022   • DTP 2004, 2004, 2004, 08/11/2005, 07/30/2008   • DTaP 2004, 2004, 2004, 08/11/2005, 07/30/2008   • HPV9 09/23/2017   • Hep A, ped/adol, 2 dose 07/20/2013, 01/27/2014   • Hep B, Adolescent or Pediatric 2004, 2004, 2004, 11/04/2013   • HiB 2004, 2004, 2004, 08/11/2005   • INFLUENZA 10/31/2005, 12/01/2015, 12/01/2015, 09/23/2017, 09/23/2017, 11/15/2018   • IPV 2004, 2004, 2004, 07/30/2008   • Influenza, seasonal, injectable, preservative free 01/17/2025   • MMR 03/24/2005, 07/30/2008   • Meningococcal MCV4, Unspecified 12/01/2015   • Meningococcal MCV4P 12/01/2015   • Pneumococcal Conjugate 13-Valent 03/30/2010   • Pneumococcal Conjugate PCV 7 2004, 2004, 2004, 08/11/2005   • Pneumococcal Polysaccharide PPV23 03/30/2010   • Tdap 12/01/2015   • Varicella 03/24/2005, 07/30/2008     Objective   /80 (BP Location: Left arm, Patient Position: Sitting, Cuff Size: Adult)   Pulse 81   Temp (!) 97.3 °F (36.3 °C) (Tympanic)   Resp 16   Ht 5' 8\" " (1.727 m)   Wt 67.6 kg (149 lb)   SpO2 99%   BMI 22.66 kg/m²     Physical Exam  Vitals and nursing note reviewed.   Constitutional:       General: He is not in acute distress.     Appearance: Normal appearance. He is not ill-appearing.   HENT:      Head: Normocephalic and atraumatic.      Right Ear: Tympanic membrane, ear canal and external ear normal.      Left Ear: Tympanic membrane, ear canal and external ear normal.      Nose: Nose normal.      Mouth/Throat:      Mouth: Mucous membranes are moist.      Pharynx: No posterior oropharyngeal erythema.   Eyes:      Conjunctiva/sclera: Conjunctivae normal.      Pupils: Pupils are equal, round, and reactive to light.   Cardiovascular:      Rate and Rhythm: Normal rate and regular rhythm.      Heart sounds: Normal heart sounds.   Pulmonary:      Effort: Pulmonary effort is normal.      Breath sounds: Normal breath sounds.   Abdominal:      General: Bowel sounds are normal.      Palpations: Abdomen is soft.   Musculoskeletal:         General: Normal range of motion.      Cervical back: Normal range of motion.   Lymphadenopathy:      Cervical: No cervical adenopathy.   Skin:     General: Skin is warm and dry.   Neurological:      Mental Status: He is alert and oriented to person, place, and time.   Psychiatric:         Mood and Affect: Mood normal.         Behavior: Behavior normal.

## 2025-01-17 NOTE — ASSESSMENT & PLAN NOTE
- Well controlled.  - Continue albuterol as needed.  - Will continue to monitor.   Orders:  •  albuterol (PROVENTIL HFA,VENTOLIN HFA) 90 mcg/act inhaler; Inhale 2 puffs every 6 (six) hours as needed for wheezing

## 2025-01-21 ENCOUNTER — RESULTS FOLLOW-UP (OUTPATIENT)
Dept: FAMILY MEDICINE CLINIC | Facility: CLINIC | Age: 20
End: 2025-01-21

## 2025-02-01 ENCOUNTER — OFFICE VISIT (OUTPATIENT)
Dept: URGENT CARE | Age: 20
End: 2025-02-01
Payer: COMMERCIAL

## 2025-02-01 VITALS
OXYGEN SATURATION: 99 % | HEART RATE: 56 BPM | TEMPERATURE: 98.6 F | HEIGHT: 68 IN | WEIGHT: 145 LBS | BODY MASS INDEX: 21.98 KG/M2 | SYSTOLIC BLOOD PRESSURE: 130 MMHG | RESPIRATION RATE: 18 BRPM | DIASTOLIC BLOOD PRESSURE: 72 MMHG

## 2025-02-01 DIAGNOSIS — H69.92 DISORDER OF LEFT EUSTACHIAN TUBE: ICD-10-CM

## 2025-02-01 DIAGNOSIS — J01.00 ACUTE MAXILLARY SINUSITIS, RECURRENCE NOT SPECIFIED: Primary | ICD-10-CM

## 2025-02-01 PROCEDURE — G0382 LEV 3 HOSP TYPE B ED VISIT: HCPCS | Performed by: PHYSICIAN ASSISTANT

## 2025-02-01 PROCEDURE — S9083 URGENT CARE CENTER GLOBAL: HCPCS | Performed by: PHYSICIAN ASSISTANT

## 2025-02-01 RX ORDER — AMOXICILLIN 500 MG/1
500 CAPSULE ORAL EVERY 8 HOURS SCHEDULED
Qty: 30 CAPSULE | Refills: 0 | Status: SHIPPED | OUTPATIENT
Start: 2025-02-01 | End: 2025-02-11

## 2025-02-01 RX ORDER — PREDNISONE 10 MG/1
TABLET ORAL
Qty: 20 TABLET | Refills: 0 | Status: SHIPPED | OUTPATIENT
Start: 2025-02-01

## 2025-02-01 NOTE — PROGRESS NOTES
"[facility]        NAME: Dallas Tafoya is a 19 y.o. male  : 2005    MRN: [id number]  DATE: 2025  TIME: 11:11 AM    /72   Pulse 56   Temp 98.6 °F (37 °C)   Resp 18   Ht 5' 8\" (1.727 m)   Wt 65.8 kg (145 lb)   SpO2 99%   BMI 22.05 kg/m²     Assessment and Plan   Acute maxillary sinusitis, recurrence not specified [J01.00]  1. Acute maxillary sinusitis, recurrence not specified  amoxicillin (AMOXIL) 500 mg capsule    predniSONE 10 mg tablet      2. Disorder of left eustachian tube  amoxicillin (AMOXIL) 500 mg capsule    predniSONE 10 mg tablet            Patient Instructions       Follow up with PCP in 3-5 days.  Proceed to  ER if symptoms worsen.    Chief Complaint     Chief Complaint   Patient presents with    Earache     Patient reports left ear pain X 2 days         History of Present Illness       Pt with left ear fullness and congestion for several day, muffled left ear hearing         Review of Systems   Review of Systems   Constitutional: Negative.    HENT:  Positive for congestion and ear pain.    Eyes: Negative.    Respiratory: Negative.     Cardiovascular: Negative.    Gastrointestinal: Negative.    Endocrine: Negative.    Genitourinary: Negative.    Musculoskeletal: Negative.    Skin: Negative.    Allergic/Immunologic: Negative.    Neurological: Negative.    Hematological: Negative.    Psychiatric/Behavioral: Negative.     All other systems reviewed and are negative.        Current Medications       Current Outpatient Medications:     albuterol (PROVENTIL HFA,VENTOLIN HFA) 90 mcg/act inhaler, Inhale 2 puffs every 6 (six) hours as needed for wheezing, Disp: 18 g, Rfl: 0    amoxicillin (AMOXIL) 500 mg capsule, Take 1 capsule (500 mg total) by mouth every 8 (eight) hours for 10 days, Disp: 30 capsule, Rfl: 0    predniSONE 10 mg tablet, 4 tabs po qd x 2 days then 3 tabs po qd x 2 days then 2 tabs po qd x 2 days then 1 tab po qd x 2 days, Disp: 20 tablet, Rfl: 0    " albuterol (2.5 mg/3 mL) 0.083 % nebulizer solution, Inhale (Patient not taking: Reported on 2/1/2025), Disp: , Rfl:     benzonatate (TESSALON PERLES) 100 mg capsule, Take 1 capsule (100 mg total) by mouth 3 (three) times a day as needed for cough (Patient not taking: Reported on 5/12/2023), Disp: 20 capsule, Rfl: 0    brompheniramine-pseudoephedrine-DM 30-2-10 MG/5ML syrup, Take 10 mL by mouth 3 (three) times a day as needed for congestion or cough (Patient not taking: Reported on 12/22/2022), Disp: 120 mL, Rfl: 0    Cholecalciferol 2000 units CAPS, Take 1 capsule by mouth (Patient not taking: Reported on 11/12/2021), Disp: , Rfl:     cyclobenzaprine (FLEXERIL) 5 mg tablet, Take 5-10 mg by mouth Three times daily as needed (Patient not taking: Reported on 6/9/2022), Disp: , Rfl:     fluticasone (FLONASE) 50 mcg/act nasal spray, 1 SPRAY INTO EACH NOSTRIL DAILY (Patient not taking: Reported on 11/7/2024), Disp: 16 g, Rfl: 0    fluticasone (FLOVENT HFA) 110 MCG/ACT inhaler, Inhale (Patient not taking: Reported on 1/31/2023), Disp: , Rfl:     LORazepam (ATIVAN) 0.5 mg tablet, Take 1 tablet (0.5 mg total) by mouth daily as needed (Severe anxiety or panic attack) (Patient not taking: Reported on 9/27/2022), Disp: 15 tablet, Rfl: 0    methylphenidate (Concerta) 36 MG ER tablet, Take 1 tablet (36 mg total) by mouth daily Max Daily Amount: 36 mg (Patient not taking: Reported on 11/7/2024), Disp: 30 tablet, Rfl: 0    methylPREDNISolone 4 MG tablet therapy pack, Use as directed on package (Patient not taking: Reported on 2/1/2025), Disp: 21 tablet, Rfl: 0    montelukast (SINGULAIR) 5 mg chewable tablet, Chew 1 tablet (5 mg total) daily at bedtime (Patient not taking: Reported on 11/7/2024), Disp: 90 tablet, Rfl: 1    naproxen (Naprosyn) 500 mg tablet, Take 1 tablet (500 mg total) by mouth 2 (two) times a day with meals for 11 days, Disp: 21 tablet, Rfl: 0    SODIUM FLUORIDE, DENTAL GEL, 1.1 % GEL, USE AS DIRECTED (Patient not  "taking: Reported on 9/27/2022), Disp: , Rfl:     VITAMIN D3 SUPER STRENGTH 2000 units tablet, , Disp: , Rfl:     Current Allergies     Allergies as of 02/01/2025 - Reviewed 02/01/2025   Allergen Reaction Noted    Codeine  03/18/2015            The following portions of the patient's history were reviewed and updated as appropriate: allergies, current medications, past family history, past medical history, past social history, past surgical history and problem list.     Past Medical History:   Diagnosis Date    ADHD (attention deficit hyperactivity disorder)     Anxiety     Depression        No past surgical history on file.    No family history on file.      Medications have been verified.        Objective   /72   Pulse 56   Temp 98.6 °F (37 °C)   Resp 18   Ht 5' 8\" (1.727 m)   Wt 65.8 kg (145 lb)   SpO2 99%   BMI 22.05 kg/m²        Physical Exam     Physical Exam  Vitals and nursing note reviewed.   Constitutional:       Appearance: Normal appearance. He is normal weight.   HENT:      Head: Normocephalic and atraumatic.      Right Ear: Tympanic membrane, ear canal and external ear normal.      Left Ear: Tympanic membrane, ear canal and external ear normal.      Nose: Congestion and rhinorrhea present.      Comments: Boggy mucosa       Mouth/Throat:      Mouth: Mucous membranes are moist.      Pharynx: Oropharynx is clear.   Cardiovascular:      Rate and Rhythm: Normal rate and regular rhythm.      Pulses: Normal pulses.      Heart sounds: Normal heart sounds.   Pulmonary:      Effort: Pulmonary effort is normal.      Breath sounds: Normal breath sounds.   Abdominal:      Palpations: Abdomen is soft.   Musculoskeletal:         General: Normal range of motion.      Cervical back: Normal range of motion and neck supple.   Skin:     General: Skin is warm.      Capillary Refill: Capillary refill takes less than 2 seconds.   Neurological:      Mental Status: He is alert and oriented to person, place, and " time.

## 2025-02-16 PROBLEM — Z00.00 HEALTHCARE MAINTENANCE: Status: RESOLVED | Noted: 2025-01-17 | Resolved: 2025-02-16

## 2025-03-10 ENCOUNTER — OFFICE VISIT (OUTPATIENT)
Dept: URGENT CARE | Age: 20
End: 2025-03-10
Payer: COMMERCIAL

## 2025-03-10 VITALS
SYSTOLIC BLOOD PRESSURE: 124 MMHG | DIASTOLIC BLOOD PRESSURE: 68 MMHG | TEMPERATURE: 97.9 F | OXYGEN SATURATION: 99 % | HEART RATE: 65 BPM | RESPIRATION RATE: 18 BRPM

## 2025-03-10 DIAGNOSIS — J02.9 SORE THROAT: Primary | ICD-10-CM

## 2025-03-10 DIAGNOSIS — R05.1 ACUTE COUGH: ICD-10-CM

## 2025-03-10 DIAGNOSIS — R09.82 POST-NASAL DRIP: ICD-10-CM

## 2025-03-10 LAB — S PYO AG THROAT QL: NEGATIVE

## 2025-03-10 PROCEDURE — 87880 STREP A ASSAY W/OPTIC: CPT

## 2025-03-10 PROCEDURE — G0382 LEV 3 HOSP TYPE B ED VISIT: HCPCS

## 2025-03-10 PROCEDURE — S9083 URGENT CARE CENTER GLOBAL: HCPCS

## 2025-03-10 RX ORDER — BROMPHENIRAMINE MALEATE, PSEUDOEPHEDRINE HYDROCHLORIDE, AND DEXTROMETHORPHAN HYDROBROMIDE 2; 30; 10 MG/5ML; MG/5ML; MG/5ML
5 SYRUP ORAL 4 TIMES DAILY PRN
Qty: 120 ML | Refills: 0 | Status: SHIPPED | OUTPATIENT
Start: 2025-03-10

## 2025-03-10 NOTE — PROGRESS NOTES
[facility]        NAME: Dallas Tafoya is a 20 y.o. male  : 2005    MRN: [id number]  DATE: March 10, 2025  TIME: 3:53 PM    Assessment and Plan   Sore throat [J02.9]  1. Sore throat  POCT rapid ANTIGEN strepA      2. Post-nasal drip  brompheniramine-pseudoephedrine-DM 30-2-10 MG/5ML syrup      3. Acute cough  brompheniramine-pseudoephedrine-DM 30-2-10 MG/5ML syrup        Congestion, PND , sore throat. Pt concerned over strep as a co-worker had it last week. No erythema, swelling or exudate. Rapid strep negative. Discussed symptom management    Patient Instructions       Follow up with PCP in 3-5 days.  Proceed to  ER if symptoms worsen.    If tests have been performed at [facility], our office will contact you with results if changes need to be made to the care plan discussed with you at the visit.  [facility]    Chief Complaint     Chief Complaint   Patient presents with    Cold Like Symptoms     Patient reports that symptoms started about 4 days ago, has been taking OTC sudafed. Greenish colored phlegm.          History of Present Illness       Congestion, PND , sore throat. Pt concerned over strep as a co-worker had it last week. No erythema, swelling or exudate. Rapid strep negative. Discussed symptom management        Review of Systems   Review of Systems   Constitutional:  Negative for fever.   HENT:  Positive for congestion, postnasal drip and sore throat.    Respiratory:  Positive for cough.          Current Medications       Current Outpatient Medications:     brompheniramine-pseudoephedrine-DM 30-2-10 MG/5ML syrup, Take 5 mL by mouth 4 (four) times a day as needed for cough or congestion, Disp: 120 mL, Rfl: 0    albuterol (2.5 mg/3 mL) 0.083 % nebulizer solution, Inhale (Patient not taking: Reported on 2025), Disp: , Rfl:     albuterol (PROVENTIL HFA,VENTOLIN HFA) 90 mcg/act inhaler, Inhale 2 puffs every 6 (six) hours as needed for wheezing,  Disp: 18 g, Rfl: 0    benzonatate (TESSALON PERLES) 100 mg capsule, Take 1 capsule (100 mg total) by mouth 3 (three) times a day as needed for cough (Patient not taking: Reported on 5/12/2023), Disp: 20 capsule, Rfl: 0    Cholecalciferol 2000 units CAPS, Take 1 capsule by mouth (Patient not taking: Reported on 11/12/2021), Disp: , Rfl:     cyclobenzaprine (FLEXERIL) 5 mg tablet, Take 5-10 mg by mouth Three times daily as needed (Patient not taking: Reported on 6/9/2022), Disp: , Rfl:     fluticasone (FLONASE) 50 mcg/act nasal spray, 1 SPRAY INTO EACH NOSTRIL DAILY (Patient not taking: Reported on 11/7/2024), Disp: 16 g, Rfl: 0    fluticasone (FLOVENT HFA) 110 MCG/ACT inhaler, Inhale (Patient not taking: Reported on 1/31/2023), Disp: , Rfl:     LORazepam (ATIVAN) 0.5 mg tablet, Take 1 tablet (0.5 mg total) by mouth daily as needed (Severe anxiety or panic attack) (Patient not taking: Reported on 9/27/2022), Disp: 15 tablet, Rfl: 0    methylphenidate (Concerta) 36 MG ER tablet, Take 1 tablet (36 mg total) by mouth daily Max Daily Amount: 36 mg (Patient not taking: Reported on 11/7/2024), Disp: 30 tablet, Rfl: 0    methylPREDNISolone 4 MG tablet therapy pack, Use as directed on package (Patient not taking: Reported on 2/1/2025), Disp: 21 tablet, Rfl: 0    montelukast (SINGULAIR) 5 mg chewable tablet, Chew 1 tablet (5 mg total) daily at bedtime (Patient not taking: Reported on 11/7/2024), Disp: 90 tablet, Rfl: 1    naproxen (Naprosyn) 500 mg tablet, Take 1 tablet (500 mg total) by mouth 2 (two) times a day with meals for 11 days, Disp: 21 tablet, Rfl: 0    predniSONE 10 mg tablet, 4 tabs po qd x 2 days then 3 tabs po qd x 2 days then 2 tabs po qd x 2 days then 1 tab po qd x 2 days, Disp: 20 tablet, Rfl: 0    SODIUM FLUORIDE, DENTAL GEL, 1.1 % GEL, USE AS DIRECTED (Patient not taking: Reported on 9/27/2022), Disp: , Rfl:     VITAMIN D3 SUPER STRENGTH 2000 units tablet, , Disp: , Rfl:     Current Allergies     Allergies  as of 03/10/2025 - Reviewed 03/10/2025   Allergen Reaction Noted    Codeine  03/18/2015            The following portions of the patient's history were reviewed and updated as appropriate: allergies, current medications, past family history, past medical history, past social history, past surgical history and problem list.     Past Medical History:   Diagnosis Date    ADHD (attention deficit hyperactivity disorder)     Anxiety     Depression        No past surgical history on file.    No family history on file.      Medications have been verified.        Objective   /68   Pulse 65   Temp 97.9 °F (36.6 °C) (Tympanic)   Resp 18   SpO2 99%   No LMP for male patient.       Physical Exam     Physical Exam  Vitals reviewed.   Constitutional:       Appearance: Normal appearance.   HENT:      Right Ear: Tympanic membrane normal.      Left Ear: Tympanic membrane normal.      Nose: Congestion and rhinorrhea present.      Mouth/Throat:      Pharynx: No oropharyngeal exudate or posterior oropharyngeal erythema.   Cardiovascular:      Rate and Rhythm: Normal rate and regular rhythm.      Pulses: Normal pulses.      Heart sounds: Normal heart sounds.   Pulmonary:      Effort: Pulmonary effort is normal.      Breath sounds: Normal breath sounds.   Lymphadenopathy:      Cervical: No cervical adenopathy.   Neurological:      Mental Status: He is alert.

## 2025-03-10 NOTE — PATIENT INSTRUCTIONS
Pseudoephedrine ( 30 mg) if syrup not available.     Benadryl at night    Claritin day ( may be taken with pseudoephedrine)    Vicks

## 2025-03-25 ENCOUNTER — OFFICE VISIT (OUTPATIENT)
Dept: URGENT CARE | Age: 20
End: 2025-03-25
Payer: COMMERCIAL

## 2025-03-25 VITALS
DIASTOLIC BLOOD PRESSURE: 70 MMHG | SYSTOLIC BLOOD PRESSURE: 116 MMHG | HEART RATE: 95 BPM | TEMPERATURE: 97.9 F | OXYGEN SATURATION: 98 % | RESPIRATION RATE: 18 BRPM

## 2025-03-25 DIAGNOSIS — H66.92 ACUTE LEFT OTITIS MEDIA: Primary | ICD-10-CM

## 2025-03-25 PROCEDURE — S9083 URGENT CARE CENTER GLOBAL: HCPCS

## 2025-03-25 PROCEDURE — G0382 LEV 3 HOSP TYPE B ED VISIT: HCPCS

## 2025-03-25 RX ORDER — FLUTICASONE PROPIONATE 50 MCG
2 SPRAY, SUSPENSION (ML) NASAL DAILY
Qty: 15.8 ML | Refills: 0 | Status: SHIPPED | OUTPATIENT
Start: 2025-03-25

## 2025-03-25 NOTE — PROGRESS NOTES
[facility]        NAME: Dallas Tafoya is a 20 y.o. male  : 2005    MRN: [id number]  DATE: 2025  TIME: 7:47 PM      Assessment and Plan     Acute left otitis media [H66.92]  1. Acute left otitis media  amoxicillin-clavulanate (AUGMENTIN) 875-125 mg per tablet    fluticasone (FLONASE) 50 mcg/act nasal spray            Patient Instructions   Take antibiotic as prescribed. Recommend probiotic use while taking antibiotic. Acetaminophen or ibuprofen for fever and pain.   Use flonase as directed.   Mucinex OTC.  Follow-up with PCP in 3-5 days. Go to ER if symptoms worsen.     Chief Complaint     Chief Complaint   Patient presents with    Cold Like Symptoms     C/o symptoms starting 3 days ago, headache started this morning. Has been taking OTC cold medications that have helped.          History of Present Illness     Patient is a 20-year-old male who presents with headache, left ear pain, congestion.  Reports nausea no vomiting. Reports chills.   Reports sinus pain and pressure.  Denies recent antibiotic use.        Review of Systems     Review of Systems   Constitutional:  Positive for chills. Negative for fever.   HENT:  Positive for congestion, sinus pressure and sinus pain. Negative for sore throat.    Respiratory:  Positive for cough.    Gastrointestinal:  Positive for nausea. Negative for diarrhea and vomiting.   All other systems reviewed and are negative.        Current Medications       Current Outpatient Medications:     amoxicillin-clavulanate (AUGMENTIN) 875-125 mg per tablet, Take 1 tablet by mouth every 12 (twelve) hours for 7 days, Disp: 14 tablet, Rfl: 0    fluticasone (FLONASE) 50 mcg/act nasal spray, 2 sprays into each nostril daily, Disp: 15.8 mL, Rfl: 0    albuterol (2.5 mg/3 mL) 0.083 % nebulizer solution, Inhale (Patient not taking: Reported on 2025), Disp: , Rfl:     albuterol (PROVENTIL HFA,VENTOLIN HFA) 90 mcg/act inhaler, Inhale 2 puffs every 6 (six) hours as needed  for wheezing, Disp: 18 g, Rfl: 0    benzonatate (TESSALON PERLES) 100 mg capsule, Take 1 capsule (100 mg total) by mouth 3 (three) times a day as needed for cough (Patient not taking: Reported on 5/12/2023), Disp: 20 capsule, Rfl: 0    brompheniramine-pseudoephedrine-DM 30-2-10 MG/5ML syrup, Take 5 mL by mouth 4 (four) times a day as needed for cough or congestion, Disp: 120 mL, Rfl: 0    Cholecalciferol 2000 units CAPS, Take 1 capsule by mouth (Patient not taking: Reported on 11/12/2021), Disp: , Rfl:     cyclobenzaprine (FLEXERIL) 5 mg tablet, Take 5-10 mg by mouth Three times daily as needed (Patient not taking: Reported on 6/9/2022), Disp: , Rfl:     fluticasone (FLONASE) 50 mcg/act nasal spray, 1 SPRAY INTO EACH NOSTRIL DAILY (Patient not taking: Reported on 11/7/2024), Disp: 16 g, Rfl: 0    fluticasone (FLOVENT HFA) 110 MCG/ACT inhaler, Inhale (Patient not taking: Reported on 1/31/2023), Disp: , Rfl:     LORazepam (ATIVAN) 0.5 mg tablet, Take 1 tablet (0.5 mg total) by mouth daily as needed (Severe anxiety or panic attack) (Patient not taking: Reported on 9/27/2022), Disp: 15 tablet, Rfl: 0    methylphenidate (Concerta) 36 MG ER tablet, Take 1 tablet (36 mg total) by mouth daily Max Daily Amount: 36 mg (Patient not taking: Reported on 11/7/2024), Disp: 30 tablet, Rfl: 0    methylPREDNISolone 4 MG tablet therapy pack, Use as directed on package (Patient not taking: Reported on 2/1/2025), Disp: 21 tablet, Rfl: 0    montelukast (SINGULAIR) 5 mg chewable tablet, Chew 1 tablet (5 mg total) daily at bedtime (Patient not taking: Reported on 11/7/2024), Disp: 90 tablet, Rfl: 1    naproxen (Naprosyn) 500 mg tablet, Take 1 tablet (500 mg total) by mouth 2 (two) times a day with meals for 11 days, Disp: 21 tablet, Rfl: 0    predniSONE 10 mg tablet, 4 tabs po qd x 2 days then 3 tabs po qd x 2 days then 2 tabs po qd x 2 days then 1 tab po qd x 2 days, Disp: 20 tablet, Rfl: 0    SODIUM FLUORIDE, DENTAL GEL, 1.1 % GEL, USE  AS DIRECTED (Patient not taking: Reported on 9/27/2022), Disp: , Rfl:     VITAMIN D3 SUPER STRENGTH 2000 units tablet, , Disp: , Rfl:     Current Allergies     Allergies as of 03/25/2025 - Reviewed 03/10/2025   Allergen Reaction Noted    Codeine  03/18/2015              The following portions of the patient's history were reviewed and updated as appropriate: allergies, current medications, past family history, past medical history, past social history, past surgical history and problem list.     Past Medical History:   Diagnosis Date    ADHD (attention deficit hyperactivity disorder)     Anxiety     Depression        No past surgical history on file.    No family history on file.      Medications have been verified.        Objective     /70   Pulse 95   Temp 97.9 °F (36.6 °C) (Tympanic)   Resp 18   SpO2 98%   No LMP for male patient.         Physical Exam     Physical Exam  Vitals and nursing note reviewed.   Constitutional:       General: He is not in acute distress.     Appearance: Normal appearance. He is not ill-appearing, toxic-appearing or diaphoretic.   HENT:      Right Ear: Ear canal and external ear normal. Tympanic membrane is erythematous. Tympanic membrane is not injected or bulging.      Left Ear: Ear canal and external ear normal. Tympanic membrane is injected, erythematous and bulging.      Nose: Congestion present.      Mouth/Throat:      Lips: Pink.      Mouth: Mucous membranes are moist.      Pharynx: Oropharynx is clear. Uvula midline.   Cardiovascular:      Rate and Rhythm: Normal rate.      Pulses: Normal pulses.      Heart sounds: Normal heart sounds, S1 normal and S2 normal.   Pulmonary:      Effort: Pulmonary effort is normal.      Breath sounds: Normal breath sounds and air entry. No stridor, decreased air movement or transmitted upper airway sounds. No decreased breath sounds, wheezing, rhonchi or rales.   Skin:     General: Skin is warm.      Capillary Refill: Capillary refill  takes less than 2 seconds.   Neurological:      General: No focal deficit present.      Mental Status: He is alert.   Psychiatric:         Mood and Affect: Mood normal.         Behavior: Behavior normal.         Thought Content: Thought content normal.         Judgment: Judgment normal.

## 2025-03-25 NOTE — LETTER
March 25, 2025     Patient: Dallas Tafoya   YOB: 2005   Date of Visit: 3/25/2025       To Whom it May Concern:    Dallas Tafoya was seen in my clinic on 3/25/2025. He may return to work on 3/26/25.         Sincerely,          ILYA Anguiano        CC: No Recipients

## 2025-03-25 NOTE — PATIENT INSTRUCTIONS
Take antibiotic as prescribed. Recommend probiotic use while taking antibiotic. Acetaminophen or ibuprofen for fever and pain.   Use flonase as directed.   Mucinex OTC.  Follow-up with PCP in 3-5 days. Go to ER if symptoms worsen.

## 2025-03-25 NOTE — Clinical Note
March 25, 2025     Patient: Dallas Tafoya   YOB: 2005   Date of Visit: 3/25/2025       To Whom it May Concern:    Dallas Tafoya was seen in my clinic on 3/25/2025. He {Return to school/sport:[id number]}.    If you have any questions or concerns, please don't hesitate to call.         Sincerely,          ILYA Anguiano        CC: No Recipients

## 2025-06-10 ENCOUNTER — OFFICE VISIT (OUTPATIENT)
Dept: URGENT CARE | Facility: CLINIC | Age: 20
End: 2025-06-10
Payer: COMMERCIAL

## 2025-06-10 VITALS
SYSTOLIC BLOOD PRESSURE: 121 MMHG | HEART RATE: 75 BPM | TEMPERATURE: 98 F | OXYGEN SATURATION: 96 % | RESPIRATION RATE: 18 BRPM | DIASTOLIC BLOOD PRESSURE: 74 MMHG

## 2025-06-10 DIAGNOSIS — R68.89 FLU-LIKE SYMPTOMS: Primary | ICD-10-CM

## 2025-06-10 PROCEDURE — G0382 LEV 3 HOSP TYPE B ED VISIT: HCPCS | Performed by: PHYSICIAN ASSISTANT

## 2025-06-10 PROCEDURE — S9083 URGENT CARE CENTER GLOBAL: HCPCS | Performed by: PHYSICIAN ASSISTANT

## 2025-06-10 NOTE — LETTER
Johana 10, 2025     Patient: Dallas Tafoya   YOB: 2005   Date of Visit: 6/10/2025       To Whom It May Concern:    It is my medical opinion that Dallas Tafoya may return to work on 06/12/2025.    If you have any questions or concerns, please don't hesitate to call.         Sincerely,        Raulito Butterfield PA-C    CC: No Recipients

## 2025-06-10 NOTE — PATIENT INSTRUCTIONS
Discussed symptoms most likely viral.  Continue over-the-counter cold/flu medication.  Work note provided.  All patient's questions answered.